# Patient Record
Sex: FEMALE | Race: ASIAN | NOT HISPANIC OR LATINO | Employment: FULL TIME | URBAN - METROPOLITAN AREA
[De-identification: names, ages, dates, MRNs, and addresses within clinical notes are randomized per-mention and may not be internally consistent; named-entity substitution may affect disease eponyms.]

---

## 2023-09-12 ENCOUNTER — TELEPHONE (OUTPATIENT)
Age: 28
End: 2023-09-12

## 2023-09-12 ENCOUNTER — OFFICE VISIT (OUTPATIENT)
Age: 28
End: 2023-09-12
Payer: COMMERCIAL

## 2023-09-12 VITALS
HEART RATE: 81 BPM | BODY MASS INDEX: 18.88 KG/M2 | SYSTOLIC BLOOD PRESSURE: 100 MMHG | HEIGHT: 61 IN | WEIGHT: 100 LBS | OXYGEN SATURATION: 100 % | DIASTOLIC BLOOD PRESSURE: 69 MMHG

## 2023-09-12 DIAGNOSIS — K64.1 GRADE II HEMORRHOIDS: Primary | ICD-10-CM

## 2023-09-12 PROCEDURE — 46221 LIGATION OF HEMORRHOID(S): CPT | Performed by: COLON & RECTAL SURGERY

## 2023-09-12 PROCEDURE — 99203 OFFICE O/P NEW LOW 30 MIN: CPT | Performed by: COLON & RECTAL SURGERY

## 2023-09-12 NOTE — TELEPHONE ENCOUNTER
Called patient lmom for patient to call with insurance information. If patient does not have insurance, patient will be a SELF PAY - patient will be responsible for payment at time of visit.

## 2023-09-12 NOTE — PROGRESS NOTES
Assessment/Plan:    No problem-specific Assessment & Plan notes found for this encounter. Diagnoses and all orders for this visit:    Grade II hemorrhoids  -     Anal Procedures      Rubber band ligation    Subjective:      Patient ID: Law Foster is a 33 y.o. female. Patient 67-year-old woman status post  6 months earlier with complaints of prolapsing internal hemorrhoids no bleeding. The following portions of the patient's history were reviewed and updated as appropriate: allergies, current medications, past family history, past medical history, past social history, past surgical history and problem list.    Review of Systems   Constitutional: Negative for chills and fever. HENT: Negative for ear pain and sore throat. Eyes: Negative for pain and visual disturbance. Respiratory: Negative for cough and shortness of breath. Cardiovascular: Negative for chest pain and palpitations. Gastrointestinal: Negative for abdominal pain and vomiting. Genitourinary: Negative for dysuria and hematuria. Musculoskeletal: Negative for arthralgias and back pain. Skin: Negative for color change and rash. Neurological: Negative for seizures and syncope. All other systems reviewed and are negative. Objective:      /69   Pulse 81   Ht 5' 1" (1.549 m)   Wt 45.4 kg (100 lb)   SpO2 100%   BMI 18.89 kg/m²          Physical Exam   Constitutional: She is oriented to person, place, and time. Eyes: Conjunctivae are normal.   Abdominal: Normal appearance. Neurological: She is alert and oriented to person, place, and time. Psychiatric: Her behavior is normal. Judgment and thought content normal.         Anorectal exam:  Inspection of the anal margin reveals no pathology. Insertion of anoscope reveals grade 2 internal hemorrhoids right lateral left lateral position.

## 2023-09-12 NOTE — PROGRESS NOTES
Anal Procedures    Performed by: Frank Walker MD  Authorized by: Frank Walker MD    Universal Protocol:     Verbal consent obtained?: Yes      Risks and benefits: Risks, benefits and alternatives were discussed      Consent given by:  Patient    Patient states understanding of procedure being performed: Yes      Time out: Immediately prior to the procedure a time out was called    A time out verifies correct patient, procedure, equipment, support staff and site/side marked as required:   Procedure Type: hemorrhoidectomy    Hemorrhoidectomy Details:   Hemorrhoid type: internal    Internal position:  Five o'clock  Single rubber band ligation    Patient tolerance:  Patient tolerated the procedure well with no immediate complications

## 2024-03-11 ENCOUNTER — NURSE TRIAGE (OUTPATIENT)
Age: 29
End: 2024-03-11

## 2024-03-11 NOTE — TELEPHONE ENCOUNTER
"Patient called into office with symptoms of possible mastitis.  Stated she delivered 04/2023 in [place].  Had chills, sweats, body aches over the weekend, generalized fatigue, breast/nipple pain and sensitivity.      Scheduled for in office evaluation tomorrow with provider.  Advised to call back if symptoms worsen.  She has no further questions at this time     Reason for Disposition   Breast pain and present > 7 days    Answer Assessment - Initial Assessment Questions  1. PAIN: \"How bad is the pain?\"  (Scale 1-10; or mild, moderate, severe)    - MILD - doesn't interfere with normal activities     - MODERATE - interferes with normal activities or awakens from sleep     - SEVERE - excruciating pain, unable to do any normal activities       Mild    2. SKIN: \"Does the skin appear red?\"        Declines    4. DELIVERY: \"When was the baby born?\"       April 2023 in [place]    5. BREASTFEEDING: \"Have you been breastfeeding?\" If yes, ask: \"When did you stop?\"      Still breast feeding    6. ONSET: \"When did the breast pain start?\" (e.g., hours, days)      Couple days    7. FEVER: \"Do you have a fever?\" If Yes, ask: \"What is it, how was it measured, and when did it start?\"      Did not take - did have chills, body aches,     8. OTHER SYMPTOMS: \"Do you have any other symptoms?\" (e.g., abdominal pain, feeling sad or depressed, weakness)      declines    Protocols used: Postpartum - Breast Pain and Engorgement-ADULT-[place]    "

## 2024-03-11 NOTE — TELEPHONE ENCOUNTER
Regarding: Mastitis  ----- Message from Bianca Guy sent at 3/11/2024 10:36 AM EDT -----  Patient called said she has mastitis and would like to be seen

## 2024-03-12 ENCOUNTER — OFFICE VISIT (OUTPATIENT)
Dept: OBGYN CLINIC | Facility: CLINIC | Age: 29
End: 2024-03-12
Payer: COMMERCIAL

## 2024-03-12 VITALS — SYSTOLIC BLOOD PRESSURE: 110 MMHG | BODY MASS INDEX: 19.35 KG/M2 | DIASTOLIC BLOOD PRESSURE: 78 MMHG | WEIGHT: 102.4 LBS

## 2024-03-12 DIAGNOSIS — Z13.220 SCREENING FOR LIPID DISORDERS: ICD-10-CM

## 2024-03-12 DIAGNOSIS — Z76.89 ENCOUNTER TO ESTABLISH CARE WITH NEW DOCTOR: ICD-10-CM

## 2024-03-12 DIAGNOSIS — N61.0 MASTITIS: Primary | ICD-10-CM

## 2024-03-12 DIAGNOSIS — Z86.32 HISTORY OF GESTATIONAL DIABETES: ICD-10-CM

## 2024-03-12 PROCEDURE — 99213 OFFICE O/P EST LOW 20 MIN: CPT

## 2024-03-12 NOTE — PATIENT INSTRUCTIONS
Mastitis   WHAT YOU NEED TO KNOW:   What is mastitis?  Mastitis is an infection of breast tissue that most often occurs in women who breastfeed. It can happen any time during breastfeeding, but usually occurs within the first 3 months after giving birth. Usually only one breast is affected.  What causes mastitis?  Mastitis is commonly caused by bacteria that enter through a break or crack in the skin on the nipple. It can also happen if a milk duct becomes plugged because the breast has not been completely emptied. If you have had mastitis before, you are more likely to have it again.   What are the signs and symptoms of mastitis?   Chills and fever    Breast swelling, redness, warmth, and tenderness     Tenderness under your arm    Fatigue and body aches    How is mastitis diagnosed and treated?  Your healthcare provider will examine you. Your provider will also ask about your symptoms, and if you have had mastitis before. You can continue to breastfeed your baby while you are being treated for mastitis. Breastfeeding when you have mastitis may help speed your recovery. You may need any of the following:  Antibiotics  help treat or prevent a bacterial infection.    Acetaminophen  decreases pain and fever. It is available without a doctor's order. Ask how much to take and how often to take it. Follow directions. Acetaminophen can cause liver damage if not taken correctly.    NSAIDs , such as ibuprofen, help decrease swelling, pain, and fever. This medicine is available with or without a doctor's order. NSAIDs can cause stomach bleeding or kidney problems in certain people. If you take blood thinner medicine, always ask your healthcare provider if NSAIDs are safe for you. Always read the medicine label and follow directions.    Incision and drainage  may be needed if the swelling does not go away and an abscess forms. Your healthcare provider will make a small incision in your breast to drain the pus.    How can I  manage my symptoms?   Continue to breastfeed from the affected breast.  This will help to prevent an abscess from forming. Breastfeed your baby on the affected side first. Apply a warm, wet cloth on your breast or take a warm shower before you feed your baby. This can help increase your milk flow. If it is painful when you breastfeed from the affected breast, feed your baby from the other breast first. Pump the affected side to completely drain your breast after breastfeeding, if needed. You may save the pumped milk to feed your baby.     Use different positions to breastfeed.  Change the position of your baby during feedings. This may help to relieve your discomfort.     Apply heat on your breast for 20 to 30 minutes every 2 hours for as many days as directed.  Heat helps decrease pain.     Apply ice after feedings. Apply ice on your breast for 15 to 20 minutes every hour or as directed. Use an ice pack, or put crushed ice in a plastic bag. Cover it with a towel. Ice helps prevent tissue damage and decreases swelling and pain.    Massage your breast.  Gently massage your breast before and during breastfeeding to help drain your milk.     Drink liquids as directed.  Ask how much liquid to drink each day and which liquids are best for you.     Rest as needed.  Do not sleep on your stomach until your infection is gone.    How can I prevent mastitis?   Breastfeed every 2 or 3 hours to prevent engorgement.  Breast engorgement develops when too much milk builds up in your breast. Take your time when you breastfeed to allow your baby to empty your breast. Try not to switch breasts too early. Express or pump after you breastfeed if your baby is not emptying your breasts when he or she feeds.    Prevent sore and cracked nipples.  A good latch prevents sore and cracked nipples. If you have sore nipples after breastfeeding, your baby may not be latched on properly. Gently break suction and reposition if your baby is only  sucking on the nipple. Talk to a lactation consultant if you need help with your baby's latch.     Care for your breasts.  Keep your nipples clean and dry between feedings. Check them for cracks, blisters, or other irritated areas. Ask a lactation specialist or your healthcare provider how to treat sore and cracked nipples. Wash your hands before and after you breastfeed your baby or pump your breasts. Wear a comfortable nursing bra that supports your breasts but is not too tight.    When should I contact my healthcare provider?   Your symptoms do not get better within 2 days.    You have a painful lump in your breast.     You have swollen and tender lymph nodes in your armpit on the same side as the affected breast.    You have questions or concerns about your condition or care.    CARE AGREEMENT:   You have the right to help plan your care. Learn about your health condition and how it may be treated. Discuss treatment options with your healthcare providers to decide what care you want to receive. You always have the right to refuse treatment. The above information is an  only. It is not intended as medical advice for individual conditions or treatments. Talk to your doctor, nurse or pharmacist before following any medical regimen to see if it is safe and effective for you.  © Copyright [employer] 2023 Information is for End User's use only and may not be sold, redistributed or otherwise used for commercial purposes.

## 2024-03-12 NOTE — PROGRESS NOTES
Assessment/Plan:    No problem-specific Assessment & Plan notes found for this encounter.    -continue massage and feeding on left breast. Call if symptoms continue or worsen.   -f/u for annual exam and pap smear.   -referral placed to establish care with new PCP.      Diagnoses and all orders for this visit:    Mastitis  -     dicloxacillin (DYNAPEN) 500 MG capsule; Take 1 capsule (500 mg total) by mouth 4 (four) times a day for 10 days    Screening for lipid disorders  -     Lipid panel; Future    History of gestational diabetes  -     Hemoglobin A1C; Future    Encounter to establish care with new doctor  -     Ambulatory Referral to Family Practice; Future          Subjective:      Patient ID: Kerri Bell is a 28 y.o. female here for possible mastitis. She delivered in April 2023, still breastfeeding. Starting having breast tenderness and flu like symptoms 3 days ago. Fevers, chills, and muscle aches have resolved. Patient states top of left breast felt sore and bruised. She's been trying to feed more on that side and massage any clogged ducts. Denies any pus or bleeding from the nipple.   She also is requesting annual check up and blood work for lipid panel and hgb A1c. She does not currently have a PCP.     The following portions of the patient's history were reviewed and updated as appropriate: allergies, current medications, past family history, past medical history, past social history, past surgical history, and problem list.    Review of Systems   Constitutional:  Negative for chills and fever.   HENT:  Negative for congestion and sore throat.    Respiratory:  Negative for shortness of breath.    Cardiovascular:  Negative for chest pain.   Gastrointestinal:  Negative for abdominal pain, nausea and vomiting.   Musculoskeletal:  Negative for back pain, myalgias and neck pain.   Skin:  Negative for pallor, rash and wound.   Neurological:  Negative for dizziness, light-headedness and headaches.    Hematological:  Negative for adenopathy.         Objective:      /78 (BP Location: Left arm, Patient Position: Sitting, Cuff Size: Adult)   Wt 46.4 kg (102 lb 6.4 oz)   LMP  (LMP Unknown)   Breastfeeding Yes   BMI 19.35 kg/m²       Physical Exam  Constitutional:       General: She is not in acute distress.     Appearance: Normal appearance. She is not ill-appearing.   Genitourinary:   Breasts:     Right: No swelling, bleeding, inverted nipple, mass, nipple discharge, skin change or tenderness.      Left: Tenderness present. No swelling, bleeding, inverted nipple, nipple discharge or skin change.   HENT:      Head: Normocephalic and atraumatic.   Eyes:      Conjunctiva/sclera: Conjunctivae normal.   Pulmonary:      Effort: Pulmonary effort is normal.   Chest:       Abdominal:      General: There is no distension.      Palpations: Abdomen is soft.      Tenderness: There is no abdominal tenderness.   Musculoskeletal:         General: Normal range of motion.      Cervical back: Neck supple.   Lymphadenopathy:      Upper Body:      Right upper body: No supraclavicular or axillary adenopathy.      Left upper body: No supraclavicular or axillary adenopathy.   Neurological:      Mental Status: She is alert and oriented to person, place, and time.   Skin:     General: Skin is warm and dry.   Psychiatric:         Mood and Affect: Mood normal.         Behavior: Behavior normal.   Vitals and nursing note reviewed.

## 2024-04-10 LAB
CHOLEST SERPL-MCNC: 194 MG/DL
CHOLEST/HDLC SERPL: 3.2 (CALC)
HBA1C MFR BLD: 5.3 % OF TOTAL HGB
HDLC SERPL-MCNC: 61 MG/DL
LDLC SERPL CALC-MCNC: 120 MG/DL (CALC)
NONHDLC SERPL-MCNC: 133 MG/DL (CALC)
TRIGL SERPL-MCNC: 43 MG/DL

## 2024-04-12 ENCOUNTER — ANNUAL EXAM (OUTPATIENT)
Dept: GYNECOLOGY | Facility: CLINIC | Age: 29
End: 2024-04-12
Payer: COMMERCIAL

## 2024-04-12 VITALS
BODY MASS INDEX: 19.32 KG/M2 | WEIGHT: 105 LBS | HEIGHT: 62 IN | DIASTOLIC BLOOD PRESSURE: 70 MMHG | SYSTOLIC BLOOD PRESSURE: 100 MMHG

## 2024-04-12 DIAGNOSIS — Z01.419 ROUTINE GYNECOLOGICAL EXAMINATION: ICD-10-CM

## 2024-04-12 DIAGNOSIS — Z12.39 ENCOUNTER FOR SCREENING BREAST EXAMINATION: ICD-10-CM

## 2024-04-12 DIAGNOSIS — Z01.419 ENCOUNTER FOR WELL WOMAN EXAM: Primary | ICD-10-CM

## 2024-04-12 DIAGNOSIS — Z11.51 SCREENING FOR HPV (HUMAN PAPILLOMAVIRUS): ICD-10-CM

## 2024-04-12 DIAGNOSIS — Z12.4 CERVICAL CANCER SCREENING: ICD-10-CM

## 2024-04-12 PROCEDURE — 99395 PREV VISIT EST AGE 18-39: CPT | Performed by: PHYSICIAN ASSISTANT

## 2024-04-12 NOTE — PROGRESS NOTES
"Assessment/Plan:    No problem-specific Assessment & Plan notes found for this encounter.       Diagnoses and all orders for this visit:    Encounter for well woman exam  -     CBC and differential; Future  -     Comprehensive metabolic panel; Future    Encounter for screening breast examination    Cervical cancer screening    Routine gynecological examination  -     GP Pap Dependent HPV Cotest >= 30 years old  -     CBC and differential; Future  -     Comprehensive metabolic panel; Future    Screening for HPV (human papillomavirus)  -     GP Pap Dependent HPV Cotest >= 30 years old          Subjective:      Patient ID: Kerri Bell is a 28 y.o. female.    Pt presents for her annual exam today--  She has no complaints  She has no bleeding or pelvic pain--nursing x 1 year  Bowel and bladder are regular  No breast concerns today  Has had mastititis in last month--resolved    pap today.    H/o hpv.  Cont pnv  Wants to TTC this year perhaps  Moved from [place]          The following portions of the patient's history were reviewed and updated as appropriate: allergies, current medications, past family history, past medical history, past social history, past surgical history, and problem list.    Review of Systems   Constitutional:  Negative for chills, fever and unexpected weight change.   HENT:  Negative for ear pain and sore throat.    Eyes:  Negative for pain and visual disturbance.   Respiratory:  Negative for cough and shortness of breath.    Cardiovascular:  Negative for chest pain and palpitations.   Gastrointestinal:  Negative for abdominal pain, blood in stool, constipation, diarrhea and vomiting.   Genitourinary: Negative.  Negative for dysuria and hematuria.   Musculoskeletal:  Negative for arthralgias and back pain.   Skin:  Negative for color change and rash.   Neurological:  Negative for seizures and syncope.   All other systems reviewed and are negative.        Objective:      /70   Ht 5' 1.5\" " (1.562 m)   Wt 47.6 kg (105 lb)   LMP 07/01/2022 (Approximate)   Breastfeeding Yes   BMI 19.52 kg/m²          Physical Exam  Vitals and nursing note reviewed.   Constitutional:       Appearance: Normal appearance. She is well-developed.   HENT:      Head: Normocephalic and atraumatic.   Chest:   Breasts:     Right: No inverted nipple, mass, nipple discharge or skin change.      Left: No inverted nipple, mass, nipple discharge or skin change.   Abdominal:      Palpations: Abdomen is soft.   Genitourinary:     General: Normal vulva.      Exam position: Supine.      Labia:         Right: No rash, tenderness or lesion.         Left: No rash, tenderness or lesion.       Vagina: Normal.      Cervix: No cervical motion tenderness, discharge or friability.      Adnexa:         Right: No mass, tenderness or fullness.          Left: No mass, tenderness or fullness.     Musculoskeletal:      Cervical back: Normal range of motion.   Lymphadenopathy:      Lower Body: No right inguinal adenopathy. No left inguinal adenopathy.   Neurological:      Mental Status: She is alert.

## 2024-04-16 ENCOUNTER — OFFICE VISIT (OUTPATIENT)
Age: 29
End: 2024-04-16
Payer: COMMERCIAL

## 2024-04-16 VITALS
HEART RATE: 94 BPM | SYSTOLIC BLOOD PRESSURE: 101 MMHG | OXYGEN SATURATION: 98 % | BODY MASS INDEX: 19.83 KG/M2 | HEIGHT: 61 IN | DIASTOLIC BLOOD PRESSURE: 78 MMHG | WEIGHT: 105 LBS

## 2024-04-16 DIAGNOSIS — K64.0 GRADE I HEMORRHOIDS: Primary | ICD-10-CM

## 2024-04-16 DIAGNOSIS — K64.4 EXTERNAL HEMORRHOIDS WITHOUT COMPLICATION: ICD-10-CM

## 2024-04-16 PROCEDURE — 99212 OFFICE O/P EST SF 10 MIN: CPT | Performed by: COLON & RECTAL SURGERY

## 2024-04-16 NOTE — PROGRESS NOTES
Assessment/Plan:  The patient had previous grade 2 internal hemorrhoidal change status post rubber band ligation.  The patient has no anal rectal bleeding at this time.  Inspection of the anal canal reveals grade 1 hemorrhoidal change.  There is a small external hemorrhoid of minor nature left posterior lateral position.    Plan:    The patient is 28 years old woman planning to have more children.  The patient was advised at this time that there is no indication, in my opinion, to undergo any surgery for her minimal hemorrhoidal change.  She was advised that with additional deliveries her hemorrhoids may progress.  She was advised it would make more sense that once she has completed her childbearing, if hemorrhoidal complaints exist, that would be the time to do hemorrhoidal surgery if indicated.   She was made aware that doing surgery, and that having additional children would shorten the long-term benefits of the anal rectal hemorrhoidal surgery.      The patient was advised that should the hemorrhoids internally progress from grade 1-2 with bleeding then then a ligation could be done and repeated independent of childbirth status       Diagnoses and all orders for this visit:    Grade I hemorrhoids    External hemorrhoids without complication          Subjective:      Patient ID: Kerri Bell is a 28 y.o. female.    HPI  The patient presents today for evaluation and treatment of possible hemorrhoidal disease.  The following portions of the patient's history were reviewed and updated as appropriate: allergies, current medications, past family history, past medical history, past social history, past surgical history, and problem list.    Review of Systems   Constitutional:  Negative for chills and fever.   HENT:  Negative for ear pain and sore throat.    Eyes:  Negative for pain and visual disturbance.   Respiratory:  Negative for cough and shortness of breath.    Cardiovascular:  Negative for chest pain and  "palpitations.   Gastrointestinal:  Negative for abdominal pain and vomiting.   Genitourinary:  Negative for dysuria and hematuria.   Musculoskeletal:  Negative for arthralgias and back pain.   Skin:  Negative for color change and rash.   Neurological:  Negative for seizures and syncope.   All other systems reviewed and are negative.        Objective:      /78   Pulse 94   Ht 5' 1\" (1.549 m)   Wt 47.6 kg (105 lb)   LMP 07/01/2022 (Approximate)   SpO2 98%   BMI 19.84 kg/m²          Colorectal Physical Exam      Inspection of the anal margin reveals minor left posterior external hemorrhoid.    Digital rectal exam reveals normal sphincteric tone    Anoscopic evaluation reveals grade 1 internal hemorrhoids.      "

## 2024-04-17 LAB
HPV HR 12 DNA CVX QL NAA+PROBE: NOT DETECTED
HPV16 DNA SPEC QL NAA+PROBE: NOT DETECTED
HPV18 DNA SPEC QL NAA+PROBE: NOT DETECTED
THIN PREP CVX: NORMAL

## 2024-04-24 LAB
ALBUMIN SERPL-MCNC: 4.4 G/DL (ref 3.6–5.1)
ALBUMIN/GLOB SERPL: 1.6 (CALC) (ref 1–2.5)
ALP SERPL-CCNC: 72 U/L (ref 31–125)
ALT SERPL-CCNC: 12 U/L (ref 6–29)
AST SERPL-CCNC: 17 U/L (ref 10–30)
BASOPHILS # BLD AUTO: 103 CELLS/UL (ref 0–200)
BASOPHILS NFR BLD AUTO: 1.1 %
BILIRUB SERPL-MCNC: 0.3 MG/DL (ref 0.2–1.2)
BUN SERPL-MCNC: 13 MG/DL (ref 7–25)
BUN/CREAT SERPL: NORMAL (CALC) (ref 6–22)
CALCIUM SERPL-MCNC: 9.6 MG/DL (ref 8.6–10.2)
CHLORIDE SERPL-SCNC: 100 MMOL/L (ref 98–110)
CO2 SERPL-SCNC: 30 MMOL/L (ref 20–32)
CREAT SERPL-MCNC: 0.59 MG/DL (ref 0.5–0.96)
EOSINOPHIL # BLD AUTO: 461 CELLS/UL (ref 15–500)
EOSINOPHIL NFR BLD AUTO: 4.9 %
ERYTHROCYTE [DISTWIDTH] IN BLOOD BY AUTOMATED COUNT: 11.6 % (ref 11–15)
GFR/BSA.PRED SERPLBLD CYS-BASED-ARV: 127 ML/MIN/1.73M2
GLOBULIN SER CALC-MCNC: 2.7 G/DL (CALC) (ref 1.9–3.7)
GLUCOSE SERPL-MCNC: 81 MG/DL (ref 65–139)
HCT VFR BLD AUTO: 41.4 % (ref 35–45)
HGB BLD-MCNC: 13.9 G/DL (ref 11.7–15.5)
LYMPHOCYTES # BLD AUTO: 2942 CELLS/UL (ref 850–3900)
LYMPHOCYTES NFR BLD AUTO: 31.3 %
MCH RBC QN AUTO: 31.4 PG (ref 27–33)
MCHC RBC AUTO-ENTMCNC: 33.6 G/DL (ref 32–36)
MCV RBC AUTO: 93.7 FL (ref 80–100)
MONOCYTES # BLD AUTO: 630 CELLS/UL (ref 200–950)
MONOCYTES NFR BLD AUTO: 6.7 %
NEUTROPHILS # BLD AUTO: 5264 CELLS/UL (ref 1500–7800)
NEUTROPHILS NFR BLD AUTO: 56 %
PLATELET # BLD AUTO: 295 THOUSAND/UL (ref 140–400)
PMV BLD REES-ECKER: 10.6 FL (ref 7.5–12.5)
POTASSIUM SERPL-SCNC: 4 MMOL/L (ref 3.5–5.3)
PROT SERPL-MCNC: 7.1 G/DL (ref 6.1–8.1)
RBC # BLD AUTO: 4.42 MILLION/UL (ref 3.8–5.1)
SODIUM SERPL-SCNC: 137 MMOL/L (ref 135–146)
WBC # BLD AUTO: 9.4 THOUSAND/UL (ref 3.8–10.8)

## 2024-11-12 ENCOUNTER — ULTRASOUND (OUTPATIENT)
Dept: OBGYN CLINIC | Facility: CLINIC | Age: 29
End: 2024-11-12

## 2024-11-12 VITALS
DIASTOLIC BLOOD PRESSURE: 68 MMHG | BODY MASS INDEX: 19.67 KG/M2 | HEIGHT: 61 IN | SYSTOLIC BLOOD PRESSURE: 120 MMHG | WEIGHT: 104.2 LBS

## 2024-11-12 DIAGNOSIS — N91.2 AMENORRHEA: Primary | ICD-10-CM

## 2024-11-12 PROBLEM — O34.219 HISTORY OF CESAREAN DELIVERY, ANTEPARTUM: Status: ACTIVE | Noted: 2024-11-12

## 2024-11-12 PROBLEM — O09.299 HISTORY OF GESTATIONAL DIABETES IN PRIOR PREGNANCY, CURRENTLY PREGNANT: Status: ACTIVE | Noted: 2024-11-12

## 2024-11-12 PROBLEM — Z86.32 HISTORY OF GESTATIONAL DIABETES IN PRIOR PREGNANCY, CURRENTLY PREGNANT: Status: ACTIVE | Noted: 2024-11-12

## 2024-11-12 PROCEDURE — 76817 TRANSVAGINAL US OBSTETRIC: CPT | Performed by: OBSTETRICS & GYNECOLOGY

## 2024-11-12 PROCEDURE — 99214 OFFICE O/P EST MOD 30 MIN: CPT | Performed by: OBSTETRICS & GYNECOLOGY

## 2024-11-12 RX ORDER — MULTIVITAMIN
1 TABLET ORAL DAILY
COMMUNITY

## 2024-11-12 NOTE — PROGRESS NOTES
Assessment/Plan:  Diagnoses and all orders for this visit:    Amenorrhea  -     Ambulatory Referral to Maternal Fetal Medicine; Future  -     AMB  OB < 14 weeks single or first gestation level 1    Other orders  -     Multiple Vitamin (multivitamin) tablet; Take 1 tablet by mouth daily        - Viable IUP @ 8w 2d EGA  - ALFONSO 25  - Continue PNV  - Patient to call for concerns  - RTO ~ 10-12 weeks for OB intake  - call MFM for 13 week NT scan/genetic testing.           Subjective:       Patient ID: Kerri Bell [id number]        Kerri Bell is a 29 y.o.  presenting to the office for pregnancy confirmation. Patient's last menstrual period was 09/15/2024 (exact date). , placing her at 8w2d today with ALFONSO of 25. She is feeling well. This was a planned pregnancy.     She has a history of prior  delivery secondary to breech presentation. This was emergent due to footling breech. Her son has mild CP and went to the NICU for 2 weeks. She wants to TOLAC.      Nausea:no  Vomiting: no  Bleeding: no  Cramping: no  Headaches: no  Fatigue: yes  Constipation: no  Blood type, if known: unknown       OB History    Para Term  AB Living   2 1 1     1   SAB IAB Ectopic Multiple Live Births           1      # Outcome Date GA Lbr Lio/2nd Weight Sex Type Anes PTL Lv   2 Current            1 Term 23   3515 g (7 lb 12 oz) M CS-Unspec   RADHA         The following portions of the patient's history were reviewed and updated as appropriate: allergies, current medications, past family history, past medical history, past social history, past surgical history, and problem list.    Allergies:  Sulfa antibiotics    Medications:    Current Outpatient Medications:     Multiple Vitamin (multivitamin) tablet, Take 1 tablet by mouth daily, Disp: , Rfl:       Review of Systems:   Review of Systems   Constitutional:  Negative for chills and fever.   Eyes:  Negative for visual disturbance.   Respiratory:   "Negative for shortness of breath.    Cardiovascular:  Negative for chest pain.   Gastrointestinal:  Negative for abdominal pain, nausea and vomiting.   Genitourinary:  Negative for vaginal bleeding, vaginal discharge and vaginal pain.   Musculoskeletal: Negative.    Skin: Negative.    Neurological: Negative.    Psychiatric/Behavioral: Negative.            Objective:       Visit Vitals  /68 (BP Location: Right arm, Patient Position: Sitting, Cuff Size: Standard)   Ht 5' 1\" (1.549 m)   Wt 47.3 kg (104 lb 3.2 oz)   LMP 09/15/2024 (Exact Date)   Breastfeeding No   BMI 19.69 kg/m²   OB Status Pregnant   Smoking Status Never   BSA 1.43 m²        GEN: The patient was alert and oriented x3, pleasant well-appearing female in no acute distress.   CV: Regular rate  PULM: nonlabored respirations  MSK: Normal gait  : normal external female genitalia   Skin: warm, dry  Neuro: no focal deficits  Psych: normal affect and judgement, cooperative    Ultrasound:     Viability US     Gestational sac: present               Location: intrauterine  Yolk sac: present  Fetal pole: present               CRL: 1.66 cm = 8w0d  Cardiac activity: present               Rate: 155 bpm     Ovaries: normal appearing bilaterally  Cul de sac: absence of free fluid  Uterus: normal in appearance           Ultrasound Probe Disinfection    A transvaginal ultrasound was performed.   Prior to use, disinfection was performed with High Level Disinfection Process (Trophon)  Probe serial number [id number]: [id number] was used    I have spent a total time of 35 minutes in caring for this patient on the day of the visit/encounter including Instructions for management, Patient and family education, Counseling / Coordination of care, Documenting in the medical record, Reviewing / ordering tests, medicine, procedures  , and Obtaining or reviewing history  .      Breonna Cowan MD  11/12/24  3:52 PM    "

## 2024-11-14 ENCOUNTER — TELEPHONE (OUTPATIENT)
Age: 29
End: 2024-11-14

## 2024-11-14 NOTE — TELEPHONE ENCOUNTER
Patient called and asked if she can have an update on the estimate and costs associated with visits for her entire pregnancy. Please advise.

## 2024-12-06 ENCOUNTER — OB ABSTRACT (OUTPATIENT)
Dept: OBGYN CLINIC | Facility: CLINIC | Age: 29
End: 2024-12-06

## 2024-12-06 NOTE — PATIENT INSTRUCTIONS
.Congratulations!! Please review our Pregnancy Essential Guide and [facility] L&D Virtual tour from our networks website.     [facility] Pregnancy Essentials Guide  [facility] ([email or web address])     [facility] ([employer])

## 2024-12-09 ENCOUNTER — INITIAL PRENATAL (OUTPATIENT)
Dept: OBGYN CLINIC | Facility: CLINIC | Age: 29
End: 2024-12-09

## 2024-12-09 VITALS
BODY MASS INDEX: 19.63 KG/M2 | WEIGHT: 104 LBS | HEIGHT: 61 IN | SYSTOLIC BLOOD PRESSURE: 102 MMHG | DIASTOLIC BLOOD PRESSURE: 70 MMHG

## 2024-12-09 DIAGNOSIS — Z34.81 PRENATAL CARE, SUBSEQUENT PREGNANCY, FIRST TRIMESTER: ICD-10-CM

## 2024-12-09 DIAGNOSIS — Z86.32 HISTORY OF GESTATIONAL DIABETES IN PRIOR PREGNANCY, CURRENTLY PREGNANT: ICD-10-CM

## 2024-12-09 DIAGNOSIS — Z86.32 HISTORY OF GESTATIONAL DIABETES MELLITUS (GDM): ICD-10-CM

## 2024-12-09 DIAGNOSIS — Z31.430 ENCOUNTER OF FEMALE FOR TESTING FOR GENETIC DISEASE CARRIER STATUS FOR PROCREATIVE MANAGEMENT: ICD-10-CM

## 2024-12-09 DIAGNOSIS — O09.299 HISTORY OF GESTATIONAL DIABETES IN PRIOR PREGNANCY, CURRENTLY PREGNANT: ICD-10-CM

## 2024-12-09 DIAGNOSIS — Z36.9 ENCOUNTER FOR ANTENATAL SCREENING: Primary | ICD-10-CM

## 2024-12-09 DIAGNOSIS — O34.219 HISTORY OF CESAREAN DELIVERY, ANTEPARTUM: ICD-10-CM

## 2024-12-09 PROCEDURE — OBC

## 2024-12-09 RX ORDER — FERROUS GLUCONATE 324(38)MG
324 TABLET ORAL
COMMUNITY

## 2024-12-09 RX ORDER — SODIUM CHLORIDE 1 G/1
1 TABLET ORAL 3 TIMES DAILY
COMMUNITY

## 2024-12-09 RX ORDER — RIBOFLAVIN (VITAMIN B2) 100 MG
100 TABLET ORAL DAILY
COMMUNITY

## 2024-12-09 NOTE — PROGRESS NOTES
.  OB INTAKE INTERVIEW  Patient is 29 y.o. who presents for OB intake at 12.1wks  She is accompanied by her son  during this encounter  The father of her baby (Luis M) is involved in the pregnancy and is 35 years old.      Last Menstrual Period: 1996  Ultrasound: Measured 8 weeks 2 days on 2024  Estimated Date of Delivery: 2025  confirmed by LMP    Signs/Symptoms of Pregnancy  Current pregnancy symptoms: None  Constipation YES  Headaches no  Cramping/spotting no  PICA cravings no    Diabetes-  Body mass index is 19.65 kg/m².  If patient has 1 or more, please order early 1 hour GTT  History of GDM YES  BMI >35 no  History of PCOS or current metformin use no  History of LGA/macrosomic infant (4000g/9lbs) no    If patient has 2 or more, please order early 1 hour GTT  BMI>30 no  AMA no  First degree relative with type 2 diabetes no  History of chronic HTN, hyperlipidemia, elevated A1C no  High risk race (, , ,  or ) YES Patient and spouse      Hypertension- if you answer yes to any of the following, please order baseline preeclampsia labs (cbc, comprehensive metabolic panel, urine protein creatinine ratio, uric acid)  History of of chronic HTN no  History of gestational HTN no  History of preeclampsia, eclampsia, or HELLP syndrome no  History of diabetes no  History of lupus,sjogrens syndrome, kidney disease no    Thyroid- if yes order TSH with reflex T4  History of thyroid disease no    Bleeding Disorder or Hx of DVT-patient or first degree relative with history of. Order the following if not done previously.   (Factor V, antithrombin III, prothrombin gene mutation, protein C and S Ag, lupus anticoagulant, anticardiolipin, beta-2 glycoprotein)   no    OB/GYN-  History of abnormal pap smear YES       Date of last pap smear 2024  History of HPV no  History of Herpes/HSV no  History of other STI (gonorrhea, chlamydia, trich) YES  Chlamydia  History of prior  no  History of prior  YES Would like to try TOLAC   History of  delivery prior to 36 weeks 6 days no  History of Varicella or Vaccination  Had vaccine   History of blood transfusion no  Ok for blood transfusion yes    Substance screening-   History of tobacco use YES occasional social 15 years ago   Currently using tobacco no  Substance Use Screen Level N/A     MRSA Screening-   Does the pt have a hx of MRSA? no    Immunizations:  Influenza vaccine given this season  NO  Discussed Tdap vaccine Yes   Discussed COVID Vaccine yes    Genetic/[facility]-  Do you or your partner have a history of any of the following in yourselves or first degree relatives?  Cystic fibrosis no  Spinal muscular atrophy no  Hemoglobinopathy/Sickle Cell/Thalassemia no  Fragile X Intellectual Disability no        discuss Carrier Screening being completed once in a lifetime as a standard of care lab test.  Had all genetic testing in past. Will get records   Appointment for Nuchal Translucency Ultrasound at [facility] scheduled for 2024      Interview education  [facility] Pregnancy Essentials Book reviewed, discussed and attached to their AVS yes    Nurse/Family Partnership- patient may qualify no; referral placed No    Prenatal lab work scripts  yes  Extra labs ordered:   1 hr gtt varicella     Aspirin/Preeclampsia Screen    Risk Level Risk Factor Recommendation   LOW Prior Uncomplicated full-term delivery YES No Aspirin recommendation        MODERATE Nulliparity no Recommend low-dose aspirin if     BMI>30 no 2 or more moderate risk factors    Family History Preeclampsia (mother/sister) no     35yr old or greater no     Black Race, Concern for SDOH/Low Socioeconomic no     IVF Pregnancy  no     Personal History Risks (low birth weight, prior adverse preg outcome, >10yr preg interval) no         HIGH History of Preeclampsia no Recommend low-dose aspirin if     Multifetal gestation no 1 or more high risk  factors    Chronic HTN no     Type 1 or 2 Diabetes no     Renal Disease no     Autoimmune Disease  no      Contraindications to ASA therapy:  NSAID/ ASA allergy: no  Nasal polyps: no  Asthma with history of ASA induced bronchospasm: no  Relative contraindications:  History of GI bleed: no  Active peptic ulcer disease: no  Severe hepatic dysfunction: no    Patient should be recommended to take ASA 162mg during this pregnancy from 12-36wks to lower her risk of preeclampsia:  Discussed , She took first pregnancy but has declined this pregnancy           The patient has a history now or in prior pregnancy notable for:  gestational DM and , 2 hematoma's  , Breech ,       Details that I feel the provider should be aware of: .This is a planned and welcomed pregnancy for parents. Patient is feeling generally well. Patient is experiencing some constipation. OTC medications and diet were discussed.     Patient had a prior primary  Due to Breech position 2023, with complications of GDM ,2 hematoma's at 24 weeks gestation, Hypoxia  Son has Cerebral Palsy . She also had GDM for 3 months postpartum . She has a past medical history of Chlamydia X2, Abnormal Pap smear, and her one Fallopian Tube is blocked. She has a family history of  Her mother and her father both have Hypertension, and high cholesterol . Her son has Eczema, and Cerebral Palsy , Her Maternal Grandmother had Hypertension,  and alzheimer's.Her Paternal grand father  had heart disease. Fob sister had intellectual disabilities from a stroke , Ethnic background is , [employer] works with physicians with genetics , she had all genetic carrier screen done prior and will get us the records . Patient is interested in TOLAC  and will speak with provider about this . She is also interested in Cord Blood, Pamphlet given . Elaine Kulkarni, listened to baby Hear rate ,it was 160 BPM .Patient knows to call OB for symptoms and how to contact her nurse  navigator. Patient was made aware to have lab orders completed before next appointment. Patient denies any questions at this point and verbalizes understanding.PN1 visit scheduled. The patient was oriented to our practice, the navigator role, reviewed delivering physicians and [facility] for Delivery. All questions were answered.    Interviewed by: Electronically Signed by Doretha Badillo LPN ,Ob Nurse Navigator

## 2024-12-13 ENCOUNTER — TELEPHONE (OUTPATIENT)
Age: 29
End: 2024-12-13

## 2024-12-13 NOTE — TELEPHONE ENCOUNTER
Patient called in wanting to speak with  in regards to know how much the appointments will be due to her insurance being a cost sharing one and the amount is needed to be known.

## 2024-12-20 ENCOUNTER — TELEPHONE (OUTPATIENT)
Dept: PERINATAL CARE | Facility: OTHER | Age: 29
End: 2024-12-20

## 2024-12-20 ENCOUNTER — ROUTINE PRENATAL (OUTPATIENT)
Dept: PERINATAL CARE | Facility: OTHER | Age: 29
End: 2024-12-20

## 2024-12-20 VITALS
DIASTOLIC BLOOD PRESSURE: 60 MMHG | HEIGHT: 61 IN | BODY MASS INDEX: 19.6 KG/M2 | HEART RATE: 91 BPM | WEIGHT: 103.8 LBS | SYSTOLIC BLOOD PRESSURE: 90 MMHG

## 2024-12-20 DIAGNOSIS — Z3A.13 13 WEEKS GESTATION OF PREGNANCY: ICD-10-CM

## 2024-12-20 DIAGNOSIS — N91.2 AMENORRHEA: ICD-10-CM

## 2024-12-20 DIAGNOSIS — O34.219 HISTORY OF CESAREAN DELIVERY, ANTEPARTUM: ICD-10-CM

## 2024-12-20 DIAGNOSIS — Z36.82 ENCOUNTER FOR NUCHAL TRANSLUCENCY TESTING: ICD-10-CM

## 2024-12-20 DIAGNOSIS — O36.80X0 ENCOUNTER TO DETERMINE FETAL VIABILITY OF PREGNANCY, SINGLE OR UNSPECIFIED FETUS: Primary | ICD-10-CM

## 2024-12-20 PROCEDURE — 76813 OB US NUCHAL MEAS 1 GEST: CPT | Performed by: OBSTETRICS & GYNECOLOGY

## 2024-12-20 PROCEDURE — 76801 OB US < 14 WKS SINGLE FETUS: CPT | Performed by: OBSTETRICS & GYNECOLOGY

## 2024-12-20 NOTE — PROGRESS NOTES
Patient chose to have [facility] [id number] Non-Invasive Prenatal Screen [id number] PLUS w/ SCA, WITH fetal sex.  Patient chose to contact her insurance to set up billing arrangements. Patient is aware that her NIPS is to be drawn at a [facility] lab.     Patient given brochure and is aware [facility] will contact patient's insurance and coordinate coverage.  Provided [facility] contact information. General inquiries [phone], Cost estimates [phone] and [facility] Billing [phone]. Website [email or web address].     Blood collection tubes labeled with patient identifiers (name, medical record number, and date of birth).     Filled out [facility] order form. Patient chose to be sent to an outpatient lab to complete blood work. .      If patient chose to have blood work drawn at a [facility] lab we requested patient notify MFM (via phone call or [employer] message) when blood collected so office can follow up on results.       Maternal Fetal Medicine will have results in approximately 5-7 business days and will call patient or notify via Ernie[employer].  Patient aware viewing lab result online will reveal fetal sex if ordered.    Patient verbalized understanding of all instructions and no questions at this time.

## 2024-12-20 NOTE — PROGRESS NOTES
"Kerri presents today for a genetic screening ultrasound.  This is her second pregnancy.  Her first pregnancy resulted in a term  section complicated by A1 gestational diabetes.  She has no other significant contributory medical, surgical, substance use, or family history.  A review of systems is otherwise negative.      We discussed the options for genetic screening, including but not limited to first trimester screening, second trimester screening, combined first and second trimester screening, noninvasive prenatal screening (NIPS) for patients at high risk and diagnostic screening through the use of CVS and amniocentesis. We discussed the risks and benefits of each approach including the sensitivities and false positive rates as well as the difference between a screening test and a diagnostic test. At the conclusion of our discussion the patient elected noninvasive prenatal screening utilizing the MaterniT 21 plus test. The patient was given a requisition to complete this bloodwork drawn in the laboratory.   The results should be available in approximately 7-10 days.    We discussed follow-up in detail and I recommend an anatomy ultrasound be scheduled for 20 weeks gestation.    Thank you very much for allowing us to participate in the care of this very nice patient. Should you have any questions, please do not hesitate to contact me.    Portions of the record may have been created with voice recognition software. Occasional wrong word or \"sound a like\" substitutions may have occurred due to the inherent limitations of voice recognition software. Read the chart carefully and recognize, using context, where substitutions have occurred.  "

## 2024-12-20 NOTE — TELEPHONE ENCOUNTER
Patient would like to call to scheduled her Detailed ultrasound at 20 weeks. Patient did not have childcare arranged at the time of scheduling.

## 2024-12-31 LAB
EXTERNAL ABO GROUPING: NORMAL
EXTERNAL ANTIBODY SCREEN: NORMAL
EXTERNAL HEMATOCRIT: 34.9 %
EXTERNAL HEMOGLOBIN: 11.5 G/DL
EXTERNAL HEPATITIS B SURFACE ANTIGEN: NEGATIVE
EXTERNAL HIV-1/2 AB-AG: NORMAL
EXTERNAL PLATELET COUNT: 262 K/ΜL
EXTERNAL RH FACTOR: POSITIVE
EXTERNAL RUBELLA IGG QUANTITATION: 3.62
EXTERNAL SYPHILIS TOTAL IGG/IGM SCREENING: NON REACTIVE
GLUCOSE 1H P 50 G GLC PO SERPL-MCNC: 139 MG/DL (ref 70–134)
HCV AB SER-ACNC: NON REACTIVE

## 2025-01-01 LAB
ABO GROUP BLD: NORMAL
APPEARANCE UR: CLEAR
BACTERIA UR CULT: NORMAL
BACTERIA URNS QL MICRO: NORMAL
BASOPHILS # BLD AUTO: 0.1 X10E3/UL (ref 0–0.2)
BASOPHILS NFR BLD AUTO: 1 %
BILIRUB UR QL STRIP: NEGATIVE
BLD GP AB SCN SERPL QL: NEGATIVE
CASTS URNS QL MICRO: NORMAL /LPF
COLOR UR: YELLOW
EOSINOPHIL # BLD AUTO: 0.1 X10E3/UL (ref 0–0.4)
EOSINOPHIL NFR BLD AUTO: 1 %
EPI CELLS #/AREA URNS HPF: NORMAL /HPF (ref 0–10)
ERYTHROCYTE [DISTWIDTH] IN BLOOD BY AUTOMATED COUNT: 12.5 % (ref 11.7–15.4)
FERRITIN SERPL-MCNC: 115 NG/ML (ref 15–150)
FOLATE SERPL-MCNC: 16.8 NG/ML
GLUCOSE 1H P 50 G GLC PO SERPL-MCNC: 139 MG/DL (ref 70–139)
GLUCOSE UR QL: NEGATIVE
HBV SURFACE AB SER-ACNC: <3.5 MIU/ML
HBV SURFACE AG SERPL QL IA: NEGATIVE
HCT VFR BLD AUTO: 34.9 % (ref 34–46.6)
HCV AB S/CO SERPL IA: NON REACTIVE
HGB BLD-MCNC: 11.5 G/DL (ref 11.1–15.9)
HGB UR QL STRIP: NEGATIVE
HIV 1+2 AB+HIV1 P24 AG SERPL QL IA: NON REACTIVE
IMM GRANULOCYTES # BLD: 0 X10E3/UL (ref 0–0.1)
IMM GRANULOCYTES NFR BLD: 0 %
IRON SATN MFR SERPL: 46 % (ref 15–55)
IRON SERPL-MCNC: 162 UG/DL (ref 27–159)
KETONES UR QL STRIP: NEGATIVE
LEUKOCYTE ESTERASE UR QL STRIP: NEGATIVE
LYMPHOCYTES # BLD AUTO: 2.2 X10E3/UL (ref 0.7–3.1)
LYMPHOCYTES NFR BLD AUTO: 23 %
Lab: NORMAL
MCH RBC QN AUTO: 30.7 PG (ref 26.6–33)
MCHC RBC AUTO-ENTMCNC: 33 G/DL (ref 31.5–35.7)
MCV RBC AUTO: 93 FL (ref 79–97)
MICRO URNS: NORMAL
MICRO URNS: NORMAL
MONOCYTES # BLD AUTO: 0.3 X10E3/UL (ref 0.1–0.9)
MONOCYTES NFR BLD AUTO: 3 %
NEUTROPHILS # BLD AUTO: 6.7 X10E3/UL (ref 1.4–7)
NEUTROPHILS NFR BLD AUTO: 72 %
NITRITE UR QL STRIP: NEGATIVE
PH UR STRIP: 7 [PH] (ref 5–7.5)
PLATELET # BLD AUTO: 262 X10E3/UL (ref 150–450)
PROT UR QL STRIP: NEGATIVE
RBC # BLD AUTO: 3.74 X10E6/UL (ref 3.77–5.28)
RBC #/AREA URNS HPF: NORMAL /HPF (ref 0–2)
RETICS/RBC NFR AUTO: 2 % (ref 0.6–2.6)
RH BLD: POSITIVE
RPR SER QL: NON REACTIVE
RUBV IGG SERPL IA-ACNC: 3.62 INDEX
SP GR UR: 1.01 (ref 1–1.03)
TIBC SERPL-MCNC: 351 UG/DL (ref 250–450)
UIBC SERPL-MCNC: 189 UG/DL (ref 131–425)
UROBILINOGEN UR STRIP-ACNC: 0.2 MG/DL (ref 0.2–1)
VIT B12 SERPL-MCNC: 726 PG/ML (ref 232–1245)
VZV IGG SER IA-ACNC: REACTIVE
WBC # BLD AUTO: 9.5 X10E3/UL (ref 3.4–10.8)
WBC #/AREA URNS HPF: NORMAL /HPF (ref 0–5)

## 2025-01-04 LAB
CFDNA.FET/CFDNA.TOTAL SFR FETUS: NORMAL %
CITATION REF LAB TEST: NORMAL
FET 13+18+21+X+Y ANEUP PLAS.CFDNA: NEGATIVE
FET CHR 21 TS PLAS.CFDNA QL: NEGATIVE
FET CHR 21 TS PLAS.CFDNA QL: NEGATIVE
FET MS X RISK WBC.DNA+CFDNA QL: NOT DETECTED
FET SEX PLAS.CFDNA DOSAGE CFDNA: NORMAL
FET TS 13 RISK PLAS.CFDNA QL: NEGATIVE
FET X + Y ANEUP RISK PLAS.CFDNA SEQ-IMP: NOT DETECTED
GA EST FROM CONCEPTION DATE: NORMAL D
GESTATIONAL AGE > 9:: YES
LAB DIRECTOR NAME PROVIDER: NORMAL
LAB DIRECTOR NAME PROVIDER: NORMAL
LABORATORY COMMENT REPORT: NORMAL
LIMITATIONS OF THE TEST: NORMAL
NEGATIVE PREDICTIVE VALUE: NORMAL
PERFORMANCE CHARACTERISTICS: NORMAL
POSITIVE PREDICTIVE VALUE: NORMAL
REF LAB TEST METHOD: NORMAL
SL AMB NOTE:: NORMAL
TEST PERFORMANCE INFO SPEC: NORMAL

## 2025-01-06 ENCOUNTER — RESULTS FOLLOW-UP (OUTPATIENT)
Dept: PERINATAL CARE | Facility: OTHER | Age: 30
End: 2025-01-06

## 2025-01-06 NOTE — RESULT ENCOUNTER NOTE
I have reviewed the results of the NIPS which are low risk.  Please call patient and notify her of these reassuring results if she has not viewed on MyChart. Please ensure she is notified of recommendation of MSAFP to be ordered and followed up through her primary Obstetrician's office.      Thank you, Silvano Leal MD

## 2025-01-08 ENCOUNTER — TELEPHONE (OUTPATIENT)
Age: 30
End: 2025-01-08

## 2025-01-08 NOTE — TELEPHONE ENCOUNTER
Patient calling in stating that she completed the 1 hour glucose and completed it with lab [employer]. Pt stating that she would like a dexacom, and pt stating that she's checking her blood sugars at home, and her postprandial blood sugars are elevated to 170s with a low carb meal. Pt would like further recommendations. Pt was notified the result isnt uploaded into her mychart from [employer], and that a message will be sent to the provider, pt verbalized understanding.

## 2025-01-15 DIAGNOSIS — O99.810 ABNORMAL GLUCOSE TOLERANCE TEST (GTT) DURING PREGNANCY, ANTEPARTUM: Primary | ICD-10-CM

## 2025-01-15 DIAGNOSIS — Z3A.17 17 WEEKS GESTATION OF PREGNANCY: ICD-10-CM

## 2025-01-15 NOTE — TELEPHONE ENCOUNTER
Patient calling in who is 17w3d, for review of labs that were scanned in and have not been reviewed.    SEC to on call provider.  Provider reviewed labs and advised pt failed 1h GTT but other labs are all normal. Order placed for 3h GTT.    Return call to patient to advise.  Patient advised she does not have insurance and does not want to get 4 more sticks.  She is opting to not do the 3h GTT. Pt advised she ordered the OTC Dexcom and will track numbers that way.      SEC to provider to advise.  Provider advised if she has diabetes in pregnancy it is our recommendation that she see and follow with the diabetes in pregnancy program.  I would recommend that she do the 3H GTT. If she does not want to do that, then she should record her numbers and bring them to her next appt so they can be reviewed by the provider then decisions about next steps will be made from there.     Patient advised of above recommendations.  She was advised to track fasting blood sugar and 2 hours postprandial.  Pt verbalized understanding, no further questions or concerns at this time.       1    10 mins

## 2025-01-16 ENCOUNTER — OB ABSTRACT (OUTPATIENT)
Dept: OBGYN CLINIC | Facility: CLINIC | Age: 30
End: 2025-01-16

## 2025-01-20 ENCOUNTER — RESULTS FOLLOW-UP (OUTPATIENT)
Dept: LABOR AND DELIVERY | Facility: HOSPITAL | Age: 30
End: 2025-01-20

## 2025-01-23 ENCOUNTER — TELEPHONE (OUTPATIENT)
Age: 30
End: 2025-01-23

## 2025-01-23 NOTE — TELEPHONE ENCOUNTER
Patient had called to reschedule her initial prenatal visit on 1/27/25, her  has an appointment at the same time. Patient is asking if there would be a later appointment on the same day or another available time next week. Please advise patient can be reached at [phone].

## 2025-01-26 PROBLEM — Z3A.19 19 WEEKS GESTATION OF PREGNANCY: Status: ACTIVE | Noted: 2025-01-26

## 2025-01-27 ENCOUNTER — INITIAL PRENATAL (OUTPATIENT)
Dept: OBGYN CLINIC | Facility: CLINIC | Age: 30
End: 2025-01-27

## 2025-01-27 VITALS
WEIGHT: 105 LBS | BODY MASS INDEX: 19.83 KG/M2 | HEIGHT: 61 IN | SYSTOLIC BLOOD PRESSURE: 98 MMHG | DIASTOLIC BLOOD PRESSURE: 60 MMHG

## 2025-01-27 DIAGNOSIS — Z86.32 HISTORY OF GESTATIONAL DIABETES IN PRIOR PREGNANCY, CURRENTLY PREGNANT: ICD-10-CM

## 2025-01-27 DIAGNOSIS — O34.219 HISTORY OF CESAREAN DELIVERY, ANTEPARTUM: Primary | ICD-10-CM

## 2025-01-27 DIAGNOSIS — O09.299 HISTORY OF GESTATIONAL DIABETES IN PRIOR PREGNANCY, CURRENTLY PREGNANT: ICD-10-CM

## 2025-01-27 DIAGNOSIS — Z3A.19 19 WEEKS GESTATION OF PREGNANCY: ICD-10-CM

## 2025-01-27 PROCEDURE — PNV: Performed by: OBSTETRICS & GYNECOLOGY

## 2025-01-27 RX ORDER — ZOLPIDEM TARTRATE 10 MG/1
10 TABLET ORAL
COMMUNITY
Start: 2024-12-26

## 2025-01-27 RX ORDER — DIPHENOXYLATE HYDROCHLORIDE AND ATROPINE SULFATE 2.5; .025 MG/1; MG/1
1 TABLET ORAL DAILY
COMMUNITY

## 2025-01-27 RX ORDER — LORAZEPAM 1 MG/1
TABLET ORAL
COMMUNITY
Start: 2024-12-26

## 2025-01-27 NOTE — PROGRESS NOTES
29 y.o.  female at 19w1d (Estimated Date of Delivery: 25) for PNV.    Pre-Kerri Vitals      Flowsheet Row Most Recent Value   Prenatal Assessment    Fetal Heart Rate 140   Prenatal Vitals    Blood Pressure 98/60   Weight - Scale 47.6 kg (105 lb)   Urine Albumin/Glucose    Dilation/Effacement/Station    Vaginal Drainage    Edema           TW.361 kg (3 lb)    Cramping: no  Bleeding: no  LOF: no  NT/13 week scan scheduled: complete, normal  AFP ordered if indicated: pt declines  Prenatal labs complete (including Heb B, HIV): yes; date completed 2024  Flu vaccine up to date: no  Covid vaccine up to date: no    Kerri had a prior  section in [place] at 40w0d. She had found a hospital where breech deliveries were performed (her baby was breech) but at 8 cm, the presenting part was a foot and she was taken for a  section. Per postpartum course was complicated by a postpartum hemorrhage. Her baby has cerebral palsy, though there were no signs of distress when she was in the hospital.   She would like to TOLAC with this pregnancy and does not want a repeat  section.    Elevated 1h GTT w/ hx of GDM - pt declined 3h GTT and has been monitoring fasting and 2h PP at home with both Dexcom and pinpricks. Though almost all of her values are within normal limits, Kerri is concerned that they are only normal because she has been watching what she eats. She previously had A1GDM and believes that she likely has it again, despite her overall normal values at home. Referral placed to diabetic educator. Pt will only meet with them if it does not incur an additional fee as she is self pay. We reviewed that there is likely an additional fee to meet with their office. We also reviewed that at [facility], they are the ones who manage GDM. Pt verbalized understanding.   She is aware that if she meets criteria for GDM and especially if glucose is poorly controlled, we would recommend an IOL.  "Kerri states that she would not get an IOL prior to 41 weeks of labor.    We briefly discussed resources to help the patient apply for insurance as well as resources via [facility] - pt declines all of the above and wishes to remain self pay.    Kerri wanted to review some of her birth plan with me today to make sure that [facility] is the right fit for her. She wishes to decline hep B vaccine and erythromycin ointment for her baby, which we reviewed is not a problem. She did ask about oral vit K instead of an injection - I reviewed with her that we do not have oral vit K here, as research shows that it is less effective.     Kerri does not wish to have any students present throughout her labor. She additionally requested there be no residents as part of her care, but we reviewed that residents are an integral part of patient care at [facility].  She would like to passively deliver her placenta and does not wish for \"controlled tugging\" of her placenta for the first hour after delivery. We reviewed that after 30 minutes, a non-delivered placenta is considered a retained placenta per ACOG. Kerri would like to know up to how many minutes we can let her wait to passively deliver her placenta.     Despite her history of postpartum hemorrhage, she would like to decline postpartum pitocin, \"unless it is really necessary\".    Pap collected: no  GC collected: no, declined  Pelvis feels adequate for NHI: yes  Plans to breastfeed: yes  Oriented to practice/delivery location.    Pt reminded to schedule Level II US.      RTO in 4 weeks.    "

## 2025-01-29 ENCOUNTER — TELEPHONE (OUTPATIENT)
Age: 30
End: 2025-01-29

## 2025-01-29 NOTE — TELEPHONE ENCOUNTER
Spoke with patient, read her the message. At this time she does not want to go ahead with the classes.

## 2025-01-29 NOTE — TELEPHONE ENCOUNTER
Called patient to see if she would like to schedule GDM classes. She mentioned to her provider because she is self pay patient the cost of the classes. Each class with code  would be an estimate of $149.96 each. I could not leave a message for patient as her mail box was full.

## 2025-01-30 ENCOUNTER — TELEPHONE (OUTPATIENT)
Age: 30
End: 2025-01-30

## 2025-01-30 NOTE — TELEPHONE ENCOUNTER
Patient calling to follow up on message, asking for a referral for a detailed US. Please call patient to discuss.

## 2025-01-30 NOTE — TELEPHONE ENCOUNTER
I spoke with your patient this morning and she requested a referral for a 2nd Trimester Detailed Anatomy scan. Said she is scheduled with [facility], however; she is getting assistance with trying to locate a more affordable option being she is self pay. The agency that is assisting her is requesting a referral so they can compare pricing. Patient will be looking for her referral in her my chart.     Thank you so much for your assistance.

## 2025-01-31 DIAGNOSIS — Z3A.19 19 WEEKS GESTATION OF PREGNANCY: Primary | ICD-10-CM

## 2025-02-06 ENCOUNTER — TELEPHONE (OUTPATIENT)
Dept: OBGYN CLINIC | Facility: CLINIC | Age: 30
End: 2025-02-06

## 2025-02-07 ENCOUNTER — TELEPHONE (OUTPATIENT)
Dept: OBGYN CLINIC | Facility: CLINIC | Age: 30
End: 2025-02-07

## 2025-02-13 ENCOUNTER — ROUTINE PRENATAL (OUTPATIENT)
Dept: PERINATAL CARE | Facility: OTHER | Age: 30
End: 2025-02-13

## 2025-02-13 VITALS
DIASTOLIC BLOOD PRESSURE: 62 MMHG | SYSTOLIC BLOOD PRESSURE: 108 MMHG | WEIGHT: 108 LBS | HEART RATE: 99 BPM | HEIGHT: 61 IN | BODY MASS INDEX: 20.39 KG/M2

## 2025-02-13 DIAGNOSIS — Z36.3 ENCOUNTER FOR ANTENATAL SCREENING FOR MALFORMATION USING ULTRASOUND: ICD-10-CM

## 2025-02-13 DIAGNOSIS — Z36.86 ENCOUNTER FOR ANTENATAL SCREENING FOR CERVICAL LENGTH: ICD-10-CM

## 2025-02-13 DIAGNOSIS — Z3A.19 19 WEEKS GESTATION OF PREGNANCY: ICD-10-CM

## 2025-02-13 DIAGNOSIS — Z3A.21 21 WEEKS GESTATION OF PREGNANCY: ICD-10-CM

## 2025-02-13 DIAGNOSIS — Z3A.20 20 WEEKS GESTATION OF PREGNANCY: Primary | ICD-10-CM

## 2025-02-13 PROCEDURE — 99212 OFFICE O/P EST SF 10 MIN: CPT | Performed by: OBSTETRICS & GYNECOLOGY

## 2025-02-13 PROCEDURE — 76805 OB US >/= 14 WKS SNGL FETUS: CPT | Performed by: OBSTETRICS & GYNECOLOGY

## 2025-02-13 PROCEDURE — 76817 TRANSVAGINAL US OBSTETRIC: CPT | Performed by: OBSTETRICS & GYNECOLOGY

## 2025-02-13 NOTE — PROGRESS NOTES
The patient was seen today for an ultrasound.  Please see ultrasound report (located under Ob Procedures) for additional details.   Thank you very much for allowing us to participate in the care of this very nice patient.  Should you have any questions, please do not hesitate to contact me.     Silvano Leal MD FACOG  Attending Physician, Maternal-Fetal Medicine  [facility]

## 2025-02-14 ENCOUNTER — TELEPHONE (OUTPATIENT)
Age: 30
End: 2025-02-14

## 2025-02-14 NOTE — TELEPHONE ENCOUNTER
Pt transferring care to [facility] group and is requesting her records to be faxed to [phone]

## 2025-05-20 ENCOUNTER — TELEPHONE (OUTPATIENT)
Age: 30
End: 2025-05-20

## 2025-05-20 NOTE — TELEPHONE ENCOUNTER
If patient is seeing the midwives at [facility] like previously stated, then [facility] should provide that service for her. Has she checked with the [facility] Ob offices? We are unable to accommodate this.    I will forward this to [place] as this was previously their patient before transfer to [facility].

## 2025-05-20 NOTE — TELEPHONE ENCOUNTER
Patient 35 week, being seen by midwife. ALFONSO 6/22/25.  Patient inquiring to see if any provider would do external cephalic version. Midwife does not provide service.

## 2025-05-21 NOTE — TELEPHONE ENCOUNTER
Pt calling to follow up on request. Pt stated [facility] has no availability for external cephalic version. Pt aware ob/gyn assoc can not accommodate and message has been for to [facility].